# Patient Record
Sex: MALE | Race: WHITE | NOT HISPANIC OR LATINO | Employment: FULL TIME | URBAN - NONMETROPOLITAN AREA
[De-identification: names, ages, dates, MRNs, and addresses within clinical notes are randomized per-mention and may not be internally consistent; named-entity substitution may affect disease eponyms.]

---

## 2018-02-17 ENCOUNTER — HOSPITAL ENCOUNTER (EMERGENCY)
Facility: HOSPITAL | Age: 33
Discharge: HOME | End: 2018-02-17
Attending: PHYSICIAN ASSISTANT | Admitting: PHYSICIAN ASSISTANT
Payer: COMMERCIAL

## 2018-02-17 VITALS
SYSTOLIC BLOOD PRESSURE: 159 MMHG | DIASTOLIC BLOOD PRESSURE: 100 MMHG | BODY MASS INDEX: 33.76 KG/M2 | TEMPERATURE: 98.1 F | RESPIRATION RATE: 18 BRPM | OXYGEN SATURATION: 98 % | WEIGHT: 206 LBS

## 2018-02-17 DIAGNOSIS — M54.42 ACUTE LEFT-SIDED LOW BACK PAIN WITH LEFT-SIDED SCIATICA: ICD-10-CM

## 2018-02-17 DIAGNOSIS — M62.830 BACK MUSCLE SPASM: ICD-10-CM

## 2018-02-17 PROCEDURE — G0463 HOSPITAL OUTPT CLINIC VISIT: HCPCS | Mod: 25

## 2018-02-17 PROCEDURE — 25000128 H RX IP 250 OP 636: Performed by: PHYSICIAN ASSISTANT

## 2018-02-17 PROCEDURE — 96372 THER/PROPH/DIAG INJ SC/IM: CPT

## 2018-02-17 PROCEDURE — 99213 OFFICE O/P EST LOW 20 MIN: CPT | Performed by: PHYSICIAN ASSISTANT

## 2018-02-17 RX ORDER — KETOROLAC TROMETHAMINE 30 MG/ML
60 INJECTION, SOLUTION INTRAMUSCULAR; INTRAVENOUS ONCE
Status: COMPLETED | OUTPATIENT
Start: 2018-02-17 | End: 2018-02-17

## 2018-02-17 RX ORDER — DIAZEPAM 10 MG/2ML
10 INJECTION, SOLUTION INTRAMUSCULAR; INTRAVENOUS ONCE
Status: COMPLETED | OUTPATIENT
Start: 2018-02-17 | End: 2018-02-17

## 2018-02-17 RX ORDER — CYCLOBENZAPRINE HCL 10 MG
TABLET ORAL
Qty: 20 TABLET | Refills: 0 | Status: SHIPPED | OUTPATIENT
Start: 2018-02-17 | End: 2018-05-20

## 2018-02-17 RX ORDER — KETOROLAC TROMETHAMINE 10 MG/1
10 TABLET, FILM COATED ORAL EVERY 6 HOURS PRN
Qty: 10 TABLET | Refills: 0 | Status: SHIPPED | OUTPATIENT
Start: 2018-02-17 | End: 2018-05-20

## 2018-02-17 RX ADMIN — KETOROLAC TROMETHAMINE 60 MG: 30 INJECTION, SOLUTION INTRAMUSCULAR at 20:02

## 2018-02-17 RX ADMIN — DIAZEPAM 10 MG: 5 INJECTION, SOLUTION INTRAMUSCULAR; INTRAVENOUS at 20:02

## 2018-02-17 ASSESSMENT — ENCOUNTER SYMPTOMS
BACK PAIN: 1
NAUSEA: 0
MYALGIAS: 1
CONSTITUTIONAL NEGATIVE: 1
PSYCHIATRIC NEGATIVE: 1
CARDIOVASCULAR NEGATIVE: 1

## 2018-02-17 NOTE — LETTER
[facility]  [address]  [place] [address]  Phone: [phone]    February 17, 2018        Fahad Guallpa  [address]  [facility] [address]          To whom it may concern:    RE: Fahad Guallpa    Patient was seen and treated today at our clinic.    Please, excuse him from work on 18 Feb 2018, due to injury.    All further work limitations to be done by a Primary Care Provider or a Back Specialist.      Sincerely,        Betty Manuel Certified  Physician Assistant  2/17/2018  7:59 PM  URGENT CARE CLINIC

## 2018-02-17 NOTE — ED AVS SNAPSHOT
[facility]    [address] [address]    Phone:  [phone]                                       Fahad Guallpa   MRN: [id number]    Department:  [facility]   Date of Visit:  2/17/2018           After Visit Summary Signature Page     I have received my discharge instructions, and my questions have been answered. I have discussed any challenges I see with this plan with the nurse or doctor.    ..........................................................................................................................................  Patient/Patient Representative Signature      ..........................................................................................................................................  Patient Representative Print Name and Relationship to Patient    ..................................................               ................................................  Date                                            Time    ..........................................................................................................................................  Reviewed by Signature/Title    ...................................................              ..............................................  Date                                                            Time

## 2018-02-17 NOTE — ED AVS SNAPSHOT
[facility]    [address] [address]    Phone:  [phone]                                       Fahad Guallpa   MRN: [id number]    Department:  [facility]   Date of Visit:  2/17/2018           Patient Information     Date Of Birth          1985        Your diagnoses for this visit were:     Acute left-sided low back pain with left-sided sciatica     Back muscle spasm        You were seen by Betty Manuel PA.      Follow-up Information     Follow up with Lexi Alvarenga MD In 1 day.    Specialty:  Family Practice    Why:  Or with a Chiropractor for reevaluation and all further work limitations if needed    Contact information:    [facility]  [address] [facility] [address]  [phone]          Follow up with [facility].    Specialty:  EMERGENCY MEDICINE    Why:  If further concerns develop    Contact information:    [address] [address] [phone]    Additional information:    From [place]: Take [address]. Turn left at [address] [facility]. Turn left at the first stoplight on [address]. At the first stop sign, take a right onto [address]. Take a left into the parking lot and continue through until you reach the North enterance of the building.       From [place]: Take [address]. Take the [address] ramp towards [place]. Turn left onto [address]. Take a slight right onto [address] [facility]. Turn left at the first stoplight on [address]. At the first stop sign, take a right onto [address]. Take a left into the parking lot and continue through until you reach the North enterance of the building.       From [place]: Take [address]. Take a right at [address]. At the first stop sign, take a right onto [address]. Take a left into the parking lot and continue through until you reach the North enterance of the  building.       Discharge References/Attachments     BACK CARE TIPS (ENGLISH)    BACK SPASM, NO TRAUMA (ENGLISH)    BACK PAIN, RELIEVING (ENGLISH)    MUSCLE SPASM (ENGLISH)         Review of your medicines      START taking        Dose / Directions Last dose taken    cyclobenzaprine 10 MG tablet   Commonly known as:  FLEXERIL   Quantity:  20 tablet        Take half to one tablet every 8 hours as needed, when not going to work   Refills:  0        ketorolac 10 MG tablet   Commonly known as:  TORADOL   Dose:  10 mg   Quantity:  10 tablet        Take 1 tablet (10 mg) by mouth every 6 hours as needed for moderate pain   Refills:  0          Our records show that you are taking the medicines listed below. If these are incorrect, please call your family doctor or clinic.        Dose / Directions Last dose taken    BUSPAR PO   Dose:  10 mg        Take 10 mg by mouth 2 times daily   Refills:  0        LOSARTAN POTASSIUM PO        Refills:  0                Prescriptions were sent or printed at these locations (2 Prescriptions)                   [facility] [address] [place] [address], [place] - [address] AT [facility] [address]   [address], [facility] [address]    Telephone:  [phone]   Fax:  [phone]   Hours:                  E-Prescribed (2 of 2)         ketorolac (TORADOL) 10 MG tablet               cyclobenzaprine (FLEXERIL) 10 MG tablet                Orders Needing Specimen Collection     None      Pending Results     No orders found from 2/15/2018 to 2/18/2018.            Pending Culture Results     No orders found from 2/15/2018 to 2/18/2018.            Thank you for choosing [facility]       Thank you for choosing [facility] for your care. Our goal is always to provide you with excellent care. Hearing back from our patients is one way we can continue to improve our services. Please take a few minutes to complete the written survey that you may receive in the mail after you visit with us. Thank you!       "  MyChart Information     [employer] lets you send messages to your doctor, view your test results, renew your prescriptions, schedule appointments and more. To sign up, go to [email or web address] . Click on \"Log in\" on the left side of the screen, which will take you to the Welcome page. Then click on \"Sign up Now\" on the right side of the page.     You will be asked to enter the access code listed below, as well as some personal information. Please follow the directions to create your username and password.     Your access code is: [email or web address]  Expires: 2018  8:03 PM     Your access code will  in 90 days. If you need help or a new code, please call your [facility] clinic or [phone].        Care EveryWhere ID     This is your Care EveryWhere ID. This could be used by other organizations to access your [facility] medical records  [id number]        Equal Access to Services     SINAI LEAL AH: Hadii meaghan villaloboso Sourszula, waaxda luqadaha, qaybta kaalmada adechino, josé parker . So [facility] [phone].    ATENCIÓN: Si habla español, tiene a lee disposición servicios gratuitos de asistencia lingüística. Llame al [phone].    We comply with applicable federal civil rights laws and [place] laws. We do not discriminate on the basis of race, color, national origin, age, disability, sex, sexual orientation, or gender identity.            After Visit Summary       This is your record. Keep this with you and show to your community pharmacist(s) and doctor(s) at your next visit.                  "

## 2018-02-18 NOTE — ED PROVIDER NOTES
History     Chief Complaint   Patient presents with     Back Pain     lower back, more on L side. Started on Tuesday - worsened today     The history is provided by the patient. No  was used.     Fahad Guallpa is a 32 year old male who has left lower back pain with pain shooting down posterior and anterior thigh.  No loss of b/b control. No loss of leg strength. Denies direct trauma or fall. No fever. Has not been doing heavy lifting.     Past Medical History:    Past Medical History:   Diagnosis Date     Hypertension, benign 3/1/2011     Palpitations 5/14/2009       Past Surgical History:    Past Surgical History:   Procedure Laterality Date     root canal       VASECTOMY  2010       Family History:    Family History   Problem Relation Age of Onset     Colon Cancer Mother      DIABETES Paternal Grandmother      Hypertension Maternal Grandmother        Social History:  Marital Status:   [2]  Social History   Substance Use Topics     Smoking status: Former Smoker     Smokeless tobacco: Current User     Types: Chew      Comment: no passive smoke exposure     Alcohol use Yes      Comment: occasionally        Medications:      LOSARTAN POTASSIUM PO   ketorolac (TORADOL) 10 MG tablet   cyclobenzaprine (FLEXERIL) 10 MG tablet   BusPIRone HCl (BUSPAR PO)         Review of Systems   Constitutional: Negative.    Cardiovascular: Negative.    Gastrointestinal: Negative for nausea.   Musculoskeletal: Positive for back pain, gait problem and myalgias.   Psychiatric/Behavioral: Negative.        Physical Exam   BP: 159/100  Heart Rate: 94  Temp: 98.1  F (36.7  C)  Resp: 18  Weight: 93.4 kg (206 lb)  SpO2: 98 %      Physical Exam   Constitutional: He is oriented to person, place, and time. He appears well-developed and well-nourished. No distress.   Cardiovascular: Normal rate.    Pulmonary/Chest: Effort normal.   Musculoskeletal:   Back: no e/e/e/e, moderate TTP to left SI joint area. Trigger  points noted. M/n/v intact. Decreased flexion due to pain.   BLE:  +AFROM, 5/5 strength, m/n/v intact   Neurological: He is alert and oriented to person, place, and time.   Skin: He is not diaphoretic.   Psychiatric: He has a normal mood and affect.   Nursing note and vitals reviewed.      ED Course     ED Course     Procedures          Medications   ketorolac (TORADOL) injection 60 mg (60 mg Intramuscular Given 2/17/18 2002)   diazepam (VALIUM) injection 10 mg (10 mg Intramuscular Given 2/17/18 2002)   pt observed x 20 minutes. Pt tolerated well      Assessments & Plan (with Medical Decision Making)     I have reviewed the nursing notes.    I have reviewed the findings, diagnosis, plan and need for follow up with the patient.      New Prescriptions    CYCLOBENZAPRINE (FLEXERIL) 10 MG TABLET    Take half to one tablet every 8 hours as needed, when not going to work    KETOROLAC (TORADOL) 10 MG TABLET    Take 1 tablet (10 mg) by mouth every 6 hours as needed for moderate pain       Final diagnoses:   Acute left-sided low back pain with left-sided sciatica   Back muscle spasm         Work excuse for tomorrow  Patient verbally educated and given appropriate education sheets for the diagnoses and has no questions.  Take medications as directed.   Follow up with your Primary Care provider or Chiropractor tomorrow for reevaluation and for all further work limitations as needed.   if symptoms increase or if concerns develop, return to the ER  Betty Manuel Certified  Physician Assistant  2/17/2018  8:08 PM  URGENT CARE CLINIC      2/17/2018   [facility]     Betty Manuel PA  02/17/18 2009

## 2018-02-19 ENCOUNTER — OFFICE VISIT (OUTPATIENT)
Dept: CHIROPRACTIC MEDICINE | Facility: OTHER | Age: 33
End: 2018-02-19
Attending: CHIROPRACTOR
Payer: COMMERCIAL

## 2018-02-19 DIAGNOSIS — M54.50 ACUTE LEFT-SIDED LOW BACK PAIN WITHOUT SCIATICA: ICD-10-CM

## 2018-02-19 DIAGNOSIS — M99.01 SEGMENTAL AND SOMATIC DYSFUNCTION OF CERVICAL REGION: ICD-10-CM

## 2018-02-19 DIAGNOSIS — M99.03 SEGMENTAL AND SOMATIC DYSFUNCTION OF LUMBAR REGION: Primary | ICD-10-CM

## 2018-02-19 DIAGNOSIS — M99.02 SEGMENTAL AND SOMATIC DYSFUNCTION OF THORACIC REGION: ICD-10-CM

## 2018-02-19 PROCEDURE — 99201 ZZC OFFICE/OUTPT VISIT, NEW, LEVEL I: CPT | Mod: 25 | Performed by: CHIROPRACTOR

## 2018-02-19 PROCEDURE — 98941 CHIROPRACT MANJ 3-4 REGIONS: CPT | Mod: AT | Performed by: CHIROPRACTOR

## 2018-02-19 NOTE — MR AVS SNAPSHOT
"              After Visit Summary   2/19/2018    Fahad Guallpa    MRN: [id number]           Patient Information     Date Of Birth          1985        Visit Information        Provider Department      2/19/2018 3:10 PM Dakota Hopkins [facility]  [facility]        Today's Diagnoses     Segmental and somatic dysfunction of lumbar region    -  1    Acute left-sided low back pain without sciatica        Segmental and somatic dysfunction of thoracic region        Segmental and somatic dysfunction of cervical region           Follow-ups after your visit        Who to contact     If you have questions or need follow up information about today's clinic visit or your schedule please contact  [facility] directly at [phone].  Normal or non-critical lab and imaging results will be communicated to you by [employer], letter or phone within 4 business days after the clinic has received the results. If you do not hear from us within 7 days, please contact the clinic through [employer] or phone. If you have a critical or abnormal lab result, we will notify you by phone as soon as possible.  Submit refill requests through [employer] or call your pharmacy and they will forward the refill request to us. Please allow 3 business days for your refill to be completed.          Additional Information About Your Visit        MyChart Information     [employer] lets you send messages to your doctor, view your test results, renew your prescriptions, schedule appointments and more. To sign up, go to [email or web address] . Click on \"Log in\" on the left side of the screen, which will take you to the Welcome page. Then click on \"Sign up Now\" on the right side of the page.     You will be asked to enter the access code listed below, as well as some personal information. Please follow the directions to create your username and password.     Your access code is: [email or web address]  Expires: 5/18/2018  8:03 PM     Your access code will "  in 90 days. If you need help or a new code, please call your [facility] clinic or [phone].        Care EveryWhere ID     This is your Care EveryWhere ID. This could be used by other organizations to access your [facility] medical records  [id number]         Blood Pressure from Last 3 Encounters:   18 159/100   04/05/15 149/99   10/18/14 135/91    Weight from Last 3 Encounters:   18 206 lb (93.4 kg)   13 196 lb (88.9 kg)              We Performed the Following     CHIROPRAC MANIP,SPINAL,3-4 REGIONS        Primary Care Provider Office Phone # Fax #    Lexi Alvarenga [phone]       [facility] [address] [facility] [address]        Equal Access to Services     JENNY LEAL : Hadii aad ku hadasho Soomaali, waaxda luqadaha, qaybta kaalmada adeegyada, josé parker . So [facility] [phone].    ATENCIÓN: Si habla español, tiene a lee disposición servicios gratuitos de asistencia lingüística. Norma al [phone].    We comply with applicable federal civil rights laws and [place] laws. We do not discriminate on the basis of race, color, national origin, age, disability, sex, sexual orientation, or gender identity.            Thank you!     Thank you for choosing  [facility]  for your care. Our goal is always to provide you with excellent care. Hearing back from our patients is one way we can continue to improve our services. Please take a few minutes to complete the written survey that you may receive in the mail after your visit with us. Thank you!             Your Updated Medication List - Protect others around you: Learn how to safely use, store and throw away your medicines at www.disposemymeds.org.          This list is accurate as of 18  3:58 PM.  Always use your most recent med list.                   Brand Name Dispense Instructions for use Diagnosis    BUSPAR PO      Take 10 mg by mouth 2 times daily         cyclobenzaprine 10 MG tablet    FLEXERIL    20 tablet    Take half to one tablet every 8 hours as needed, when not going to work        ketorolac 10 MG tablet    TORADOL    10 tablet    Take 1 tablet (10 mg) by mouth every 6 hours as needed for moderate pain        LOSARTAN POTASSIUM PO

## 2018-02-19 NOTE — PROGRESS NOTES
Subjective Finding:    Chief compalint: Patient presents with:  Back Pain: bilateral. left worse  Neck Pain: left arm pain  , Pain Scale: 6/10, Intensity: sharp, Duration: 1 weeks, Radiating: left buttock.    Date of injury:     Activities that the pain restricts:   Home/household/hobbies/social activities: yes.  Work duties: yes.  Sleep: yes.  Makes symptoms better: rest.  Makes symptoms worse: activity.  Have you seen anyone else for the symptoms? Yes: MD.  Work related: no.  Automobile related injury: no.    Objective and Assessment:    Posture Analysis:   High shoulder: .  Head tilt: .  High iliac crest: left.  Head carriage: neutral.  Thoracic Kyphosis: neutral.  Lumbar Lordosis: forward.    Lumbar Range of Motion: extension decreased and left lateral flexion decreased.  Cervical Range of Motion: .  Thoracic Range of Motion: .  Extremity Range of Motion: .    Palpation:   Quad lumb: left, referred pain: no    Segmental dysfunction pre-treatment and treatment area: C4, C6, T6, L5 and PSIS Left.    Assessment post-treatment:  Cervical: ROM increased.  Thoracic: ROM increased.  Lumbar: ROM increased.    Comments: .  Was in ER for the pain over the weekend    Complicating Factors: .    Procedure(s):  CMT:  77432 Chiropractic manipulative treatment 3-4 regions performed   Cervical: Diversified, See above for level, Supine, Thoracic: Diversified, See above for level, Prone and Lumbar: Diversified, See above for level, Side posture    Modalities:  None performed this visit    Therapeutic procedures:  None    Plan:  Treatment plan: PRN.  Instructed patient: stretch as instructed at visit.  Short term goals: reduce pain.  Long term goals: restore normal function.  Prognosis: excellent.

## 2018-05-20 ENCOUNTER — HOSPITAL ENCOUNTER (EMERGENCY)
Facility: HOSPITAL | Age: 33
Discharge: HOME | End: 2018-05-20
Attending: NURSE PRACTITIONER | Admitting: NURSE PRACTITIONER
Payer: COMMERCIAL

## 2018-05-20 VITALS
SYSTOLIC BLOOD PRESSURE: 146 MMHG | OXYGEN SATURATION: 96 % | RESPIRATION RATE: 18 BRPM | DIASTOLIC BLOOD PRESSURE: 78 MMHG | TEMPERATURE: 97.7 F

## 2018-05-20 DIAGNOSIS — H10.9 BACTERIAL CONJUNCTIVITIS OF LEFT EYE: ICD-10-CM

## 2018-05-20 PROCEDURE — G0463 HOSPITAL OUTPT CLINIC VISIT: HCPCS

## 2018-05-20 PROCEDURE — 99213 OFFICE O/P EST LOW 20 MIN: CPT | Performed by: NURSE PRACTITIONER

## 2018-05-20 RX ORDER — POLYMYXIN B SULFATE AND TRIMETHOPRIM 1; 10000 MG/ML; [USP'U]/ML
1 SOLUTION OPHTHALMIC
Qty: 1 BOTTLE | Refills: 0 | OUTPATIENT
Start: 2018-05-20 | End: 2024-08-08

## 2018-05-20 RX ORDER — POLYMYXIN B SULFATE AND TRIMETHOPRIM 1; 10000 MG/ML; [USP'U]/ML
1 SOLUTION OPHTHALMIC 4 TIMES DAILY
Qty: 2 ML | Refills: 0 | Status: SHIPPED | OUTPATIENT
Start: 2018-05-20 | End: 2018-05-27

## 2018-05-20 ASSESSMENT — ENCOUNTER SYMPTOMS
RESPIRATORY NEGATIVE: 1
MUSCULOSKELETAL NEGATIVE: 1
HEMATOLOGIC/LYMPHATIC NEGATIVE: 1
PSYCHIATRIC NEGATIVE: 1
ALLERGIC/IMMUNOLOGIC NEGATIVE: 1
EYE REDNESS: 1
CARDIOVASCULAR NEGATIVE: 1
EYE DISCHARGE: 1
NEUROLOGICAL NEGATIVE: 1
CONSTITUTIONAL NEGATIVE: 1
ENDOCRINE NEGATIVE: 1
GASTROINTESTINAL NEGATIVE: 1

## 2018-05-20 NOTE — ED PROVIDER NOTES
History     Chief Complaint   Patient presents with     Conjunctivitis     woke up with matter in left eye and redness. denies any trauma or getting anything in his eye. denies pain.      The history is provided by the patient.     Fahad Guallpa is a 32 year old male who presents to the  care for possible pink eye.  He states he woke up with morning with left goopy and stuck shut. Denies any pain or itching to the eye. He did clean his eye out with saline solution this morning and has decreased drainage.     Problem List:    There are no active problems to display for this patient.       Past Medical History:    Past Medical History:   Diagnosis Date     Hypertension, benign 3/1/2011     Palpitations 5/14/2009       Past Surgical History:    Past Surgical History:   Procedure Laterality Date     root canal       VASECTOMY  2010       Family History:    Family History   Problem Relation Age of Onset     Colon Cancer Mother      DIABETES Paternal Grandmother      Hypertension Maternal Grandmother        Social History:  Marital Status:   [2]  Social History   Substance Use Topics     Smoking status: Former Smoker     Smokeless tobacco: Current User     Types: Chew      Comment: no passive smoke exposure     Alcohol use Yes      Comment: occasionally        Medications:      BusPIRone HCl (BUSPAR PO)   LOSARTAN POTASSIUM PO   trimethoprim-polymyxin b (POLYTRIM) ophthalmic solution         Review of Systems   Constitutional: Negative.    Eyes: Positive for discharge and redness.   Respiratory: Negative.    Cardiovascular: Negative.    Gastrointestinal: Negative.    Endocrine: Negative.    Genitourinary: Negative.    Musculoskeletal: Negative.    Skin: Negative.    Allergic/Immunologic: Negative.    Neurological: Negative.    Hematological: Negative.    Psychiatric/Behavioral: Negative.        Physical Exam   BP: 146/78  Heart Rate: 100  Temp: 97.7  F (36.5  C)  Resp: 18  SpO2: 96 %      Physical Exam    Eyes: Pupils are equal, round, and reactive to light. Right conjunctiva is not injected. Left conjunctiva is injected.   Cardiovascular: Normal heart sounds.    Pulmonary/Chest: Effort normal.   Nursing note and vitals reviewed.      ED Course     ED Course     Procedures               Critical Care time:  none               No results found for this or any previous visit (from the past 24 hour(s)).    Medications - No data to display    Assessments & Plan (with Medical Decision Making)     I have reviewed the nursing notes.    I have reviewed the findings, diagnosis, plan and need for follow up with the patient.   Fahad is D/C to home. Note provided for work. Discussed treatment and use of drops. Fahad should follow up with opthalmology if he develops pain, change in vision or any concerns with his eyes. Fahad agrees with plan.     Discharge Medication List as of 5/20/2018  5:29 PM      START taking these medications    Details   trimethoprim-polymyxin b (POLYTRIM) ophthalmic solution Apply 1 drop to eye 4 times daily for 7 days, Disp-2 mL, R-0, E-Prescribe             Final diagnoses:   Bacterial conjunctivitis of left eye       5/20/2018   [facility]     [facility] LEROY CNP  05/20/18 2799

## 2018-05-20 NOTE — DISCHARGE INSTRUCTIONS
Bacterial Conjunctivitis    You have an infection in the membranes covering the white part of the eye. This part of the eye is called the conjunctiva. The infection is called conjunctivitis. The most common symptoms of conjunctivitis include a thick, pus-like discharge from the eye, swollen eyelids, redness, eyelids sticking together upon awakening, and a gritty or scratchy feeling in the eye. Your infection was caused by bacteria. It may be treated with medicine. With treatment, the infection takes about 7 to 10 days to resolve.  Home care    Use prescribed antibiotic eye drops or ointment as directed to treat the infection.    Apply a warm compress (towel soaked in warm water) to the affected eye 3 to 4 times a day. Do this just before applying medicine to the eye.    Use a warm, wet cloth to wipe away crusting of the eyelids in the morning. This is caused by mucus drainage during the night. You may also use saline irrigating solution or artificial tears to rinse away mucus in the eye. Do not put a patch over the eye.    Wash your hands before and after touching the infected eye. This is to prevent spreading the infection to the other eye, and to other people. Don't share your towels or washcloths with others.    You may use acetaminophen or ibuprofen to control pain, unless another medicine was prescribed. (Note: If you have chronic liver or kidney disease or have ever had a stomach ulcer or gastrointestinal bleeding, talk with your doctor before using these medicines.)    Don't wear contact lenses until your eyes have healed and all symptoms are gone.  Follow-up care  Follow up with your healthcare provider, or as advised.  When to seek medical advice  Call your healthcare provider right away if any of these occur:    Worsening vision    Increasing pain in the eye    Increasing swelling or redness of the eyelid    Redness spreading around the eye  Date Last Reviewed: 7/1/2017 2000-2017 [employer] [address] [place] [address]. All rights reserved. This information is not intended as a substitute for professional medical care. Always follow your healthcare professional's instructions.

## 2018-05-20 NOTE — LETTER
[facility]  [address]  [place] [address]  Phone: [phone]    May 20, 2018        Fahad Guallpa  [address]  [place] [address]          To whom it may concern:    RE: Fahad Guallpa    Patient was seen and treated today at our clinic. Please excuse from work on 5/21/2018 for illness    Please contact me for questions or concerns.      Sincerely,        Mickie HARPER [facility]  Family Nurse Practitioner

## 2018-05-20 NOTE — ED AVS SNAPSHOT
[facility]    [address] [address]    Phone:  [phone]                                       Fahad Guallpa   MRN: [id number]    Department:  [facility]   Date of Visit:  5/20/2018           Patient Information     Date Of Birth          1985        Your diagnoses for this visit were:     Bacterial conjunctivitis of left eye        You were seen by Mickie Hager APRN CNP.      Follow-up Information     Follow up with Lexi Alvarenga MD.    Specialty:  Family Practice    Why:  As needed, If symptoms worsen    Contact information:    formerly [facility]  [address] [facility] [address] [phone]          Follow up with [facility].    Specialty:  EMERGENCY MEDICINE    Why:  If symptoms worsen    Contact information:    [address] [address] [phone]    Additional information:    From [place]: Take [address]. Turn left at [address]. Turn left at the first stoplight on [address]. At the first stop sign, take a right onto [address]. Take a left into the parking lot and continue through until you reach the North enterance of the building.       From [place]: Take [address]. Take the [address] ramp towards [place]. Turn left onto [address]. Take a slight right onto [address]. Turn left at the first stoplight on [address]. At the first stop sign, take a right onto [address]. Take a left into the parking lot and continue through until you reach the North enterance of the building.       From [place]: Take [address]. Take a right at [address]. At the first stop sign, take a right onto [address]. Take a left into the parking lot and continue through until you reach the North enterance of the building.         Discharge Instructions         Bacterial Conjunctivitis    You have an infection in  the membranes covering the white part of the eye. This part of the eye is called the conjunctiva. The infection is called conjunctivitis. The most common symptoms of conjunctivitis include a thick, pus-like discharge from the eye, swollen eyelids, redness, eyelids sticking together upon awakening, and a gritty or scratchy feeling in the eye. Your infection was caused by bacteria. It may be treated with medicine. With treatment, the infection takes about 7 to 10 days to resolve.  Home care    Use prescribed antibiotic eye drops or ointment as directed to treat the infection.    Apply a warm compress (towel soaked in warm water) to the affected eye 3 to 4 times a day. Do this just before applying medicine to the eye.    Use a warm, wet cloth to wipe away crusting of the eyelids in the morning. This is caused by mucus drainage during the night. You may also use saline irrigating solution or artificial tears to rinse away mucus in the eye. Do not put a patch over the eye.    Wash your hands before and after touching the infected eye. This is to prevent spreading the infection to the other eye, and to other people. Don't share your towels or washcloths with others.    You may use acetaminophen or ibuprofen to control pain, unless another medicine was prescribed. (Note: If you have chronic liver or kidney disease or have ever had a stomach ulcer or gastrointestinal bleeding, talk with your doctor before using these medicines.)    Don't wear contact lenses until your eyes have healed and all symptoms are gone.  Follow-up care  Follow up with your healthcare provider, or as advised.  When to seek medical advice  Call your healthcare provider right away if any of these occur:    Worsening vision    Increasing pain in the eye    Increasing swelling or redness of the eyelid    Redness spreading around the eye  Date Last Reviewed: 7/1/2017 2000-2017 [employer]. [address] [place] [address], [place] [address]. All  "rights reserved. This information is not intended as a substitute for professional medical care. Always follow your healthcare professional's instructions.             Review of your medicines      START taking        Dose / Directions Last dose taken    trimethoprim-polymyxin b ophthalmic solution   Commonly known as:  POLYTRIM   Dose:  1 drop   Quantity:  2 mL        Apply 1 drop to eye 4 times daily for 7 days   Refills:  0          Our records show that you are taking the medicines listed below. If these are incorrect, please call your family doctor or clinic.        Dose / Directions Last dose taken    BUSPAR PO   Dose:  10 mg        Take 10 mg by mouth 2 times daily   Refills:  0        LOSARTAN POTASSIUM PO        Refills:  0                Prescriptions were sent or printed at these locations (1 Prescription)                   [facility] - [place], [place] - [address] AT [facility] [address], [place] [address]    Telephone:  [phone]   Fax:  [phone]   Hours:                  E-Prescribed (1 of 1)         trimethoprim-polymyxin b (POLYTRIM) ophthalmic solution                Orders Needing Specimen Collection     None      Pending Results     No orders found from 5/18/2018 to 5/21/2018.            Pending Culture Results     No orders found from 5/18/2018 to 5/21/2018.            Thank you for choosing [facility]       Thank you for choosing [facility] for your care. Our goal is always to provide you with excellent care. Hearing back from our patients is one way we can continue to improve our services. Please take a few minutes to complete the written survey that you may receive in the mail after you visit with us. Thank you!        [employer] Information     [facility] lets you send messages to your doctor, view your test results, renew your prescriptions, schedule appointments and more. To sign up, go to [email or web address] . Click on \"Log in\" on the left side of " "the screen, which will take you to the Welcome page. Then click on \"Sign up Now\" on the right side of the page.     You will be asked to enter the access code listed below, as well as some personal information. Please follow the directions to create your username and password.     Your access code is: [email or web address]  Expires: 2018  5:29 PM     Your access code will  in 90 days. If you need help or a new code, please call your [place] clinic or [phone].        Care EveryWhere ID     This is your Care EveryWhere ID. This could be used by other organizations to access your [place] medical records  [id number]        Equal Access to Services     SINAI LEAL : Eh Brooke, dena steven, ally ramos, josé coker. So [facility] [phone].    ATENCIÓN: Si habla español, tiene a lee disposición servicios gratuitos de asistencia lingüística. Llame al [phone].    We comply with applicable federal civil rights laws and [place] laws. We do not discriminate on the basis of race, color, national origin, age, disability, sex, sexual orientation, or gender identity.            After Visit Summary       This is your record. Keep this with you and show to your community pharmacist(s) and doctor(s) at your next visit.                  "

## 2018-05-20 NOTE — ED AVS SNAPSHOT
[facility]    [address] [address]    Phone:  [phone]                                       Fahad Guallpa   MRN: [id number]    Department:  [facility]   Date of Visit:  5/20/2018           After Visit Summary Signature Page     I have received my discharge instructions, and my questions have been answered. I have discussed any challenges I see with this plan with the nurse or doctor.    ..........................................................................................................................................  Patient/Patient Representative Signature      ..........................................................................................................................................  Patient Representative Print Name and Relationship to Patient    ..................................................               ................................................  Date                                            Time    ..........................................................................................................................................  Reviewed by Signature/Title    ...................................................              ..............................................  Date                                                            Time

## 2020-01-15 ENCOUNTER — OFFICE VISIT (OUTPATIENT)
Dept: CHIROPRACTIC MEDICINE | Facility: OTHER | Age: 35
End: 2020-01-15
Attending: CHIROPRACTOR
Payer: COMMERCIAL

## 2020-01-15 DIAGNOSIS — M54.50 ACUTE BILATERAL LOW BACK PAIN WITHOUT SCIATICA: ICD-10-CM

## 2020-01-15 DIAGNOSIS — M99.01 SEGMENTAL AND SOMATIC DYSFUNCTION OF CERVICAL REGION: ICD-10-CM

## 2020-01-15 DIAGNOSIS — M99.03 SEGMENTAL AND SOMATIC DYSFUNCTION OF LUMBAR REGION: Primary | ICD-10-CM

## 2020-01-15 DIAGNOSIS — M99.02 SEGMENTAL AND SOMATIC DYSFUNCTION OF THORACIC REGION: ICD-10-CM

## 2020-01-15 PROCEDURE — 99212 OFFICE O/P EST SF 10 MIN: CPT | Mod: 25 | Performed by: CHIROPRACTOR

## 2020-01-15 PROCEDURE — 98941 CHIROPRACT MANJ 3-4 REGIONS: CPT | Mod: AT | Performed by: CHIROPRACTOR

## 2020-01-15 NOTE — PROGRESS NOTES
Subjective Finding:    Chief compalint: Patient presents with:  Back Pain  Neck Pain  , Pain Scale: 6/10, Intensity: sharp, Duration: 1 weeks, Radiating: left buttock.    Date of injury:     Activities that the pain restricts:   Home/household/hobbies/social activities: yes.  Work duties: yes.  Sleep: yes.  Makes symptoms better: rest.  Makes symptoms worse: activity.  Have you seen anyone else for the symptoms? Yes: MD.  Work related: no.  Automobile related injury: no.    Objective and Assessment:    Posture Analysis:   High shoulder: .  Head tilt: .  High iliac crest: left.  Head carriage: neutral.  Thoracic Kyphosis: neutral.  Lumbar Lordosis: forward.    Lumbar Range of Motion: extension decreased and left lateral flexion decreased.  Cervical Range of Motion: .  Thoracic Range of Motion: .  Extremity Range of Motion: .    Palpation:   Quad lumb: left, referred pain: no    Segmental dysfunction pre-treatment and treatment area: C4, C6, T6, L5 and PSIS Left.    Assessment post-treatment:  Cervical: ROM increased.  Thoracic: ROM increased.  Lumbar: ROM increased.    Comments: .    Complicating Factors: .    Procedure(s):  [facility]:  42429 Chiropractic manipulative treatment 3-4 regions performed   Cervical: Diversified, See above for level, Supine, Thoracic: Diversified, See above for level, Prone and Lumbar: Diversified, See above for level, Side posture    Modalities:  None performed this visit    Therapeutic procedures:  None    Plan:  Treatment plan: PRN.  Instructed patient: stretch as instructed at visit.  Short term goals: reduce pain.  Long term goals: restore normal function.  Prognosis: excellent.

## 2020-03-04 ENCOUNTER — OFFICE VISIT (OUTPATIENT)
Dept: CHIROPRACTIC MEDICINE | Facility: OTHER | Age: 35
End: 2020-03-04
Attending: CHIROPRACTOR
Payer: COMMERCIAL

## 2020-03-04 DIAGNOSIS — M99.02 SEGMENTAL AND SOMATIC DYSFUNCTION OF THORACIC REGION: ICD-10-CM

## 2020-03-04 DIAGNOSIS — M99.03 SEGMENTAL AND SOMATIC DYSFUNCTION OF LUMBAR REGION: Primary | ICD-10-CM

## 2020-03-04 DIAGNOSIS — M54.50 ACUTE BILATERAL LOW BACK PAIN WITHOUT SCIATICA: ICD-10-CM

## 2020-03-04 PROCEDURE — 98940 CHIROPRACT MANJ 1-2 REGIONS: CPT | Mod: AT | Performed by: CHIROPRACTOR

## 2020-03-04 NOTE — PROGRESS NOTES
Subjective Finding:    Chief compalint: Patient presents with:  Back Pain  , Pain Scale: 6/10, Intensity: sharp, Duration: 1 weeks, Radiating: left buttock.    Date of injury:     Activities that the pain restricts:   Home/household/hobbies/social activities: yes.  Work duties: yes.  Sleep: yes.  Makes symptoms better: rest.  Makes symptoms worse: activity.  Have you seen anyone else for the symptoms? Yes: MD.  Work related: no.  Automobile related injury: no.    Objective and Assessment:    Posture Analysis:   High shoulder: .  Head tilt: .  High iliac crest: left.  Head carriage: neutral.  Thoracic Kyphosis: neutral.  Lumbar Lordosis: forward.    Lumbar Range of Motion: extension decreased and left lateral flexion decreased.  Cervical Range of Motion: .  Thoracic Range of Motion: .  Extremity Range of Motion: .    Palpation:   Quad lumb: left, referred pain: no    Segmental dysfunction pre-treatment and treatment area: C4, C6, T6, L5 and PSIS Left.    Assessment post-treatment:  Cervical: ROM increased.  Thoracic: ROM increased.  Lumbar: ROM increased.    Comments: .    Complicating Factors: .    Procedure(s):  [facility]:  20328 Chiropractic manipulative treatment 3-4 regions performed   Cervical: Diversified, See above for level, Supine, Thoracic: Diversified, See above for level, Prone and Lumbar: Diversified, See above for level, Side posture    Modalities:  None performed this visit    Therapeutic procedures:  None    Plan:  Treatment plan: PRN.  Instructed patient: stretch as instructed at visit.  Short term goals: reduce pain.  Long term goals: restore normal function.  Prognosis: excellent.

## 2020-09-11 ENCOUNTER — HOSPITAL ENCOUNTER (EMERGENCY)
Facility: HOSPITAL | Age: 35
Discharge: HOME | End: 2020-09-11
Attending: PHYSICIAN ASSISTANT | Admitting: PHYSICIAN ASSISTANT
Payer: COMMERCIAL

## 2020-09-11 VITALS
OXYGEN SATURATION: 97 % | SYSTOLIC BLOOD PRESSURE: 135 MMHG | RESPIRATION RATE: 16 BRPM | HEART RATE: 110 BPM | DIASTOLIC BLOOD PRESSURE: 86 MMHG | TEMPERATURE: 97.6 F

## 2020-09-11 DIAGNOSIS — H10.9 CONJUNCTIVITIS: ICD-10-CM

## 2020-09-11 PROCEDURE — G0463 HOSPITAL OUTPT CLINIC VISIT: HCPCS

## 2020-09-11 PROCEDURE — 99213 OFFICE O/P EST LOW 20 MIN: CPT | Mod: Z6 | Performed by: PHYSICIAN ASSISTANT

## 2020-09-11 RX ORDER — TOBRAMYCIN 3 MG/ML
1-2 SOLUTION/ DROPS OPHTHALMIC EVERY 4 HOURS
Qty: 5 ML | Refills: 0 | Status: SHIPPED | OUTPATIENT
Start: 2020-09-11 | End: 2020-09-18

## 2020-09-11 ASSESSMENT — ENCOUNTER SYMPTOMS
EYE REDNESS: 1
EYE DISCHARGE: 1
CHILLS: 0
PHOTOPHOBIA: 0
FATIGUE: 0
FEVER: 0
COUGH: 0

## 2020-09-11 NOTE — ED PROVIDER NOTES
History     Chief Complaint   Patient presents with     Conjunctivitis     HPI  Fahad Guallpa is a 35 year old male who presents to urgent care for evaluation of conjunctivitis in right eye.  Patient states that he awoke this morning with redness, crusting and purulent discharge in his right eye.  He states that with the drainage he sometimes has blurred vision but once he clears it his vision is normal.  He denies any fevers, cold symptoms, or any other associated symptoms.  Patient tried flushing his eye with saline solution.    Allergies:  Allergies   Allergen Reactions     Amoxicillin      GI upset     Seasonal Allergies        Problem List:    There are no active problems to display for this patient.       Past Medical History:    Past Medical History:   Diagnosis Date     Hypertension, benign 3/1/2011     Palpitations 5/14/2009       Past Surgical History:    Past Surgical History:   Procedure Laterality Date     root canal       VASECTOMY  2010       Family History:    Family History   Problem Relation Age of Onset     Colon Cancer Mother      Diabetes Paternal Grandmother      Hypertension Maternal Grandmother        Social History:  Marital Status:   [2]  Social History     Tobacco Use     Smoking status: Former Smoker     Smokeless tobacco: Current User     Types: Chew     Tobacco comment: no passive smoke exposure   Substance Use Topics     Alcohol use: Yes     Comment: occasionally     Drug use: No        Medications:    BusPIRone HCl (BUSPAR PO)  LOSARTAN POTASSIUM PO  tobramycin (TOBREX) 0.3 % ophthalmic solution          Review of Systems   Constitutional: Negative for chills, fatigue and fever.   HENT: Negative for congestion.    Eyes: Positive for discharge and redness. Negative for photophobia and visual disturbance.   Respiratory: Negative for cough.    All other systems reviewed and are negative.      Physical Exam   BP: 135/86  Pulse: 110  Temp: 97.6  F (36.4  C)  Resp: 16  SpO2: 97  %      Physical Exam  Vitals signs and nursing note reviewed.   Constitutional:       General: He is not in acute distress.     Appearance: He is not ill-appearing or toxic-appearing.   HENT:      Head: Normocephalic.      Nose: Nose normal.   Eyes:      General:         Right eye: Discharge present.      Extraocular Movements: Extraocular movements intact.      Conjunctiva/sclera:      Right eye: Right conjunctiva is injected.      Pupils: Pupils are equal, round, and reactive to light.   Cardiovascular:      Rate and Rhythm: Regular rhythm.      Heart sounds: Normal heart sounds.   Pulmonary:      Breath sounds: Normal breath sounds.   Neurological:      Mental Status: He is alert and oriented to person, place, and time.         ED Course        Procedures              Critical Care time:               No results found for this or any previous visit (from the past 24 hour(s)).    Medications - No data to display    Assessments & Plan (with Medical Decision Making)   #1.  Conjunctivitis, right eye    Discussed exam findings with patient.  Patient is prescribed Tobrex ophthalmic solution.  Proper hygiene is discussed at length with patient.  Patient given work note excusing him from work tonight.  Any further concerns please return to urgent care or follow-up with primary care provider.  Patient verbalizes understanding and agreement of plan.        I have reviewed the nursing notes.    I have reviewed the findings, diagnosis, plan and need for follow up with the patient.      New Prescriptions    TOBRAMYCIN (TOBREX) 0.3 % OPHTHALMIC SOLUTION    Place 1-2 drops into the right eye every 4 hours for 7 days       Final diagnoses:   Conjunctivitis       9/11/2020   [facility]     Louis Tracey PA-C  09/11/20 1827

## 2020-09-11 NOTE — ED NOTES
Patient discharged with understanding of instructions and recommendations.   Denies any questions or concerns.     Adalgisa Mancini LPN on 9/11/2020 at 6:33 PM

## 2020-09-11 NOTE — ED AVS SNAPSHOT
[facility]  [address] [address]  Phone:  [phone]                                    Fahad Guallpa   MRN: [id number]    Department[facility]  [facility]   Date of Visit:  9/11/2020           After Visit Summary Signature Page    I have received my discharge instructions, and my questions have been answered. I have discussed any challenges I see with this plan with the nurse or doctor.    ..........................................................................................................................................  Patient/Patient Representative Signature      ..........................................................................................................................................  Patient Representative Print Name and Relationship to Patient    ..................................................               ................................................  Date                                   Time    ..........................................................................................................................................  Reviewed by Signature/Title    ...................................................              ..............................................  Date                                               Time          22EPIC Rev 08/18

## 2020-09-11 NOTE — ED TRIAGE NOTES
"Patient presents today with c/o right eye irritation.   Worked midnights last night - woke up at 4pm and noticed eye.   Redness / draining / crusting / \"gets gunky.\"  Blurred vision when drainage builds up.   Denies pain.   Tried to flush with saline solution.   Wears glasses and contacts.   "

## 2020-12-19 ENCOUNTER — ALLIED HEALTH/NURSE VISIT (OUTPATIENT)
Dept: FAMILY MEDICINE | Facility: OTHER | Age: 35
End: 2020-12-19
Attending: FAMILY MEDICINE
Payer: COMMERCIAL

## 2020-12-19 DIAGNOSIS — R05.9 COUGH: Primary | ICD-10-CM

## 2020-12-19 PROCEDURE — U0003 INFECTIOUS AGENT DETECTION BY NUCLEIC ACID (DNA OR RNA); SEVERE ACUTE RESPIRATORY SYNDROME CORONAVIRUS 2 (SARS-COV-2) (CORONAVIRUS DISEASE [COVID-19]), AMPLIFIED PROBE TECHNIQUE, MAKING USE OF HIGH THROUGHPUT TECHNOLOGIES AS DESCRIBED BY CMS-2020-01-R: HCPCS | Mod: ZL | Performed by: FAMILY MEDICINE

## 2020-12-19 PROCEDURE — C9803 HOPD COVID-19 SPEC COLLECT: HCPCS

## 2020-12-19 NOTE — LETTER
[facility]  [address]  [place] [address]  [phone]          December 19, 2020    RE:  Fahad Guallpa                                                                                                                                                       [address] [place] [address]            To whom it may concern:    Patient was seen today for Covid testing.       Thank you,          Comfort Camarena LPN

## 2020-12-20 LAB
SARS-COV-2 RNA SPEC QL NAA+PROBE: NOT DETECTED
SPECIMEN SOURCE: NORMAL

## 2021-01-15 ENCOUNTER — HEALTH MAINTENANCE LETTER (OUTPATIENT)
Age: 36
End: 2021-01-15

## 2021-09-25 ENCOUNTER — HEALTH MAINTENANCE LETTER (OUTPATIENT)
Age: 36
End: 2021-09-25

## 2022-03-12 ENCOUNTER — HEALTH MAINTENANCE LETTER (OUTPATIENT)
Age: 37
End: 2022-03-12

## 2022-07-09 ENCOUNTER — HOSPITAL ENCOUNTER (EMERGENCY)
Facility: HOSPITAL | Age: 37
Discharge: HOME | End: 2022-07-09
Attending: INTERNAL MEDICINE | Admitting: INTERNAL MEDICINE
Payer: COMMERCIAL

## 2022-07-09 ENCOUNTER — APPOINTMENT (OUTPATIENT)
Dept: GENERAL RADIOLOGY | Facility: HOSPITAL | Age: 37
End: 2022-07-09
Attending: INTERNAL MEDICINE
Payer: COMMERCIAL

## 2022-07-09 VITALS
WEIGHT: 200 LBS | DIASTOLIC BLOOD PRESSURE: 101 MMHG | HEIGHT: 68 IN | TEMPERATURE: 96.8 F | RESPIRATION RATE: 16 BRPM | SYSTOLIC BLOOD PRESSURE: 148 MMHG | BODY MASS INDEX: 30.31 KG/M2 | HEART RATE: 108 BPM | OXYGEN SATURATION: 93 %

## 2022-07-09 DIAGNOSIS — S82.831A CLOSED FRACTURE OF DISTAL END OF RIGHT FIBULA, UNSPECIFIED FRACTURE MORPHOLOGY, INITIAL ENCOUNTER: ICD-10-CM

## 2022-07-09 PROCEDURE — 99283 EMERGENCY DEPT VISIT LOW MDM: CPT | Performed by: INTERNAL MEDICINE

## 2022-07-09 PROCEDURE — 99283 EMERGENCY DEPT VISIT LOW MDM: CPT

## 2022-07-09 PROCEDURE — 73610 X-RAY EXAM OF ANKLE: CPT | Mod: RT

## 2022-07-09 PROCEDURE — 250N000013 HC RX MED GY IP 250 OP 250 PS 637: Performed by: INTERNAL MEDICINE

## 2022-07-09 RX ORDER — OXYCODONE HYDROCHLORIDE 5 MG/1
5 TABLET ORAL ONCE
Status: COMPLETED | OUTPATIENT
Start: 2022-07-09 | End: 2022-07-09

## 2022-07-09 RX ORDER — OXYCODONE AND ACETAMINOPHEN 5; 325 MG/1; MG/1
1 TABLET ORAL EVERY 6 HOURS PRN
Qty: 10 TABLET | Refills: 0 | Status: SHIPPED | OUTPATIENT
Start: 2022-07-09 | End: 2024-05-09

## 2022-07-09 RX ORDER — IBUPROFEN 600 MG/1
600 TABLET, FILM COATED ORAL ONCE
Status: COMPLETED | OUTPATIENT
Start: 2022-07-09 | End: 2022-07-09

## 2022-07-09 RX ADMIN — IBUPROFEN 600 MG: 600 TABLET ORAL at 01:39

## 2022-07-09 RX ADMIN — OXYCODONE HYDROCHLORIDE 5 MG: 5 TABLET ORAL at 03:07

## 2022-07-09 ASSESSMENT — ENCOUNTER SYMPTOMS
HEADACHES: 0
CONFUSION: 0
NECK STIFFNESS: 0
ABDOMINAL PAIN: 0
EYE REDNESS: 0
SHORTNESS OF BREATH: 0
FEVER: 0
ARTHRALGIAS: 0
COLOR CHANGE: 0
DIFFICULTY URINATING: 0

## 2022-07-09 NOTE — Clinical Note
Fahad Guallpa was seen and treated in our emergency department on 7/9/2022.  He may return to work on 07/13/2022.       If you have any questions or concerns, please don't hesitate to call.      Oumar Virgen MD

## 2022-07-09 NOTE — ED PROVIDER NOTES
History     Chief Complaint   Patient presents with     Ankle Pain     ankle     The history is provided by the patient.   Trauma  Mechanism of injury: fall  Injury location: foot  Injury location detail: R ankle  Incident location: outdoors     Fall:       Fall occurred: tripped       Point of impact: feet    Current symptoms:       Associated symptoms:             Denies abdominal pain, chest pain and headache.         Allergies:  Allergies   Allergen Reactions     Amoxicillin      GI upset     Seasonal Allergies        Problem List:    There are no problems to display for this patient.       Past Medical History:    Past Medical History:   Diagnosis Date     Hypertension, benign 3/1/2011     Palpitations 5/14/2009       Past Surgical History:    Past Surgical History:   Procedure Laterality Date     root canal       VASECTOMY  2010       Family History:    Family History   Problem Relation Age of Onset     Colon Cancer Mother      Diabetes Paternal Grandmother      Hypertension Maternal Grandmother        Social History:  Marital Status:   [2]  Social History     Tobacco Use     Smoking status: Former Smoker     Smokeless tobacco: Current User     Types: Chew     Tobacco comment: no passive smoke exposure   Substance Use Topics     Alcohol use: Yes     Comment: occasionally     Drug use: No        Medications:    oxyCODONE-acetaminophen (PERCOCET) 5-325 MG tablet  BusPIRone HCl (BUSPAR PO)  LOSARTAN POTASSIUM PO          Review of Systems   Constitutional: Negative for fever.   HENT: Negative for congestion.    Eyes: Negative for redness.   Respiratory: Negative for shortness of breath.    Cardiovascular: Negative for chest pain.   Gastrointestinal: Negative for abdominal pain.   Genitourinary: Negative for difficulty urinating.   Musculoskeletal: Negative for arthralgias and neck stiffness.   Skin: Negative for color change.   Neurological: Negative for headaches.   Psychiatric/Behavioral: Negative for  "confusion.       Physical Exam   BP: 169/96  Pulse: 100  Temp: 96.8  F (36  C)  Resp: 16  Height: 172.7 cm (5' 8\")  Weight: 90.7 kg (200 lb)  SpO2: 100 %      Physical Exam  Constitutional:       General: He is not in acute distress.     Appearance: He is not diaphoretic.   HENT:      Head: Atraumatic.   Eyes:      General: No scleral icterus.     Pupils: Pupils are equal, round, and reactive to light.   Cardiovascular:      Heart sounds: Normal heart sounds.   Pulmonary:      Effort: No respiratory distress.      Breath sounds: Normal breath sounds.   Abdominal:      General: Bowel sounds are normal.      Palpations: Abdomen is soft.      Tenderness: There is no abdominal tenderness.   Musculoskeletal:      Right ankle: Swelling present. No deformity or ecchymosis. Tenderness present over the lateral malleolus.      Right foot: No swelling or deformity.        Legs:    Skin:     General: Skin is warm.      Findings: No rash.         ED Course                 Procedures                No results found for this or any previous visit (from the past 24 hour(s)).    Medications   oxyCODONE (ROXICODONE) tablet 5 mg (has no administration in time range)   ibuprofen (ADVIL/MOTRIN) tablet 600 mg (600 mg Oral Given 7/9/22 6409)       Assessments & Plan (with Medical Decision Making)   Tripped and twisted his right ankle  Xray : distal fibula fracture    RICE, Boot + crutches  Follow-up with PCP/ ortho clinic  I have reviewed the nursing notes.    I have reviewed the findings, diagnosis, plan and need for follow up with the patient.      New Prescriptions    OXYCODONE-ACETAMINOPHEN (PERCOCET) 5-325 MG TABLET    Take 1 tablet by mouth every 6 hours as needed for severe pain       Final diagnoses:   Closed fracture of distal end of right fibula, unspecified fracture morphology, initial encounter       7/9/2022   [facility]     Oumar Virgen MD  07/09/22 5019    "

## 2022-07-09 NOTE — DISCHARGE INSTRUCTIONS
[facility]  [address] [place] [address]  [phone]

## 2022-12-26 ENCOUNTER — HEALTH MAINTENANCE LETTER (OUTPATIENT)
Age: 37
End: 2022-12-26

## 2023-04-22 ENCOUNTER — HEALTH MAINTENANCE LETTER (OUTPATIENT)
Age: 38
End: 2023-04-22

## 2023-11-13 ENCOUNTER — OFFICE VISIT (OUTPATIENT)
Dept: CHIROPRACTIC MEDICINE | Facility: OTHER | Age: 38
End: 2023-11-13
Attending: CHIROPRACTOR
Payer: COMMERCIAL

## 2023-11-13 DIAGNOSIS — M54.50 ACUTE BILATERAL LOW BACK PAIN WITHOUT SCIATICA: ICD-10-CM

## 2023-11-13 DIAGNOSIS — M99.01 SEGMENTAL AND SOMATIC DYSFUNCTION OF CERVICAL REGION: ICD-10-CM

## 2023-11-13 DIAGNOSIS — M99.02 SEGMENTAL AND SOMATIC DYSFUNCTION OF THORACIC REGION: ICD-10-CM

## 2023-11-13 DIAGNOSIS — M99.03 SEGMENTAL AND SOMATIC DYSFUNCTION OF LUMBAR REGION: Primary | ICD-10-CM

## 2023-11-13 PROCEDURE — 99202 OFFICE O/P NEW SF 15 MIN: CPT | Mod: 25 | Performed by: CHIROPRACTOR

## 2023-11-13 PROCEDURE — 98941 CHIROPRACT MANJ 3-4 REGIONS: CPT | Mod: AT | Performed by: CHIROPRACTOR

## 2023-11-14 NOTE — PROGRESS NOTES
Subjective Finding:    Chief compalint: Patient presents with:  Back Pain: Low back pain from lifting a trailer   , Pain Scale: 5/10, Intensity: sharp, Duration: 2 days, Radiating:  bilateral buttock.    Date of injury:     Activities that the pain restricts:   Home/household/hobbies/social activities: Yes.  Work duties: Yes.  Sleep: No.  Makes symptoms better: rest.  Makes symptoms worse: activity and lumbar flexion.  Have you seen anyone else for the symptoms? No.  Work related: No.  Automobile related injury: No.    Objective and Assessment:    Posture Analysis:   High shoulder: .  Head tilt: .  High iliac crest: .  Head carriage: neutral.  Thoracic Kyphosis: neutral.  Lumbar Lordosis: forward.    Lumbar Range of Motion: extension decreased.  Cervical Range of Motion: .  Thoracic Range of Motion: .  Extremity Range of Motion: .    Palpation:   Quad lumb: bilateral, referred pain: no    Segmental dysfunction pre-treatment and treatment area: C4, T6, T7, L4, and L5.    Assessment post-treatment:  Cervical: ROM increased.  Thoracic: ROM increased.  Lumbar: ROM increased.    Comments: .      Complicating Factors: .    Procedure(s):  CMT:  81669 Chiropractic manipulative treatment 3-4 regions performed   Cervical: Diversified, See above for level, Supine, Thoracic: Diversified, See above for level, Prone, and Lumbar: Diversified, See above for level, Side posture    Modalities:  None performed this visit    Therapeutic procedures:  None    Plan:  Treatment plan:  2 times per week for 1 weeks.  Instructed patient: stretch as instructed at visit and walk 10 minutes.  Short term goals: increase ROM.  Long term goals: restore normal function.  Prognosis: excellent.

## 2024-05-09 ENCOUNTER — HOSPITAL ENCOUNTER (EMERGENCY)
Facility: HOSPITAL | Age: 39
Discharge: HOME | End: 2024-05-09
Attending: NURSE PRACTITIONER | Admitting: NURSE PRACTITIONER
Payer: COMMERCIAL

## 2024-05-09 VITALS
SYSTOLIC BLOOD PRESSURE: 155 MMHG | BODY MASS INDEX: 30.41 KG/M2 | WEIGHT: 200 LBS | RESPIRATION RATE: 18 BRPM | OXYGEN SATURATION: 97 % | HEART RATE: 95 BPM | DIASTOLIC BLOOD PRESSURE: 110 MMHG | TEMPERATURE: 97.8 F

## 2024-05-09 DIAGNOSIS — J02.0 ACUTE STREPTOCOCCAL PHARYNGITIS: Primary | ICD-10-CM

## 2024-05-09 LAB — GROUP A STREP BY PCR: DETECTED

## 2024-05-09 PROCEDURE — 87651 STREP A DNA AMP PROBE: CPT | Performed by: NURSE PRACTITIONER

## 2024-05-09 PROCEDURE — G0463 HOSPITAL OUTPT CLINIC VISIT: HCPCS

## 2024-05-09 PROCEDURE — 99213 OFFICE O/P EST LOW 20 MIN: CPT | Performed by: NURSE PRACTITIONER

## 2024-05-09 RX ORDER — AZITHROMYCIN 250 MG/1
500 TABLET, FILM COATED ORAL DAILY
Qty: 10 TABLET | Refills: 0 | Status: SHIPPED | OUTPATIENT
Start: 2024-05-09 | End: 2024-05-14

## 2024-05-09 ASSESSMENT — ENCOUNTER SYMPTOMS
TROUBLE SWALLOWING: 0
SORE THROAT: 1
FEVER: 0
CHILLS: 0

## 2024-05-09 NOTE — ED PROVIDER NOTES
History     Chief Complaint   Patient presents with    Pharyngitis     HPI  Fahad Guallpa is a 38 year old male who presents ambulatory to urgent care for evaluation of a sore throat with symptoms waxing and waning for about a week.  Patient initially thought it was allergies so he tried some Claritin which initially helped.  He has not noticed any significant fevers or chills.  No cough, shortness of breath or chest pain.  He is swallowing with minimal difficulty.  A coworker just recently got diagnosed with strep throat.  Patient tells me that he looked in his throat and he is also myself the back of his throat.  He is concerned about strep throat.  Declines respiratory viral testing today.    He has also been taking other OTC medications with minimal effectiveness.  History for tonsillectomy.    Allergies:  Allergies   Allergen Reactions    Amoxicillin      GI upset    Seasonal Allergies        Problem List:    There are no problems to display for this patient.       Past Medical History:    Past Medical History:   Diagnosis Date    Hypertension, benign 3/1/2011    Palpitations 5/14/2009       Past Surgical History:    Past Surgical History:   Procedure Laterality Date    root canal      VASECTOMY  2010       Family History:    Family History   Problem Relation Age of Onset    Colon Cancer Mother     Diabetes Paternal Grandmother     Hypertension Maternal Grandmother        Social History:  Marital Status:   [2]  Social History     Tobacco Use    Smoking status: Former    Smokeless tobacco: Current     Types: Chew    Tobacco comments:     no passive smoke exposure   Substance Use Topics    Alcohol use: Yes     Comment: occasionally    Drug use: No        Medications:    azithromycin (ZITHROMAX) 250 MG tablet  BusPIRone HCl (BUSPAR PO)  LOSARTAN POTASSIUM PO          Review of Systems   Constitutional:  Negative for chills and fever.   HENT:  Positive for sore throat. Negative for trouble swallowing.     All other systems reviewed and are negative.      Physical Exam   BP: (!) 155/110  Pulse: 95  Temp: 97.8  F (36.6  C)  Resp: 18  Weight: 90.7 kg (200 lb)  SpO2: 97 %      Physical Exam  Vitals and nursing note reviewed.   Constitutional:       General: He is not in acute distress.     Appearance: He is well-developed. He is not diaphoretic.   HENT:      Head: Normocephalic and atraumatic.      Right Ear: Tympanic membrane and ear canal normal.      Left Ear: Tympanic membrane and ear canal normal.      Mouth/Throat:      Mouth: Mucous membranes are moist. No oral lesions.      Pharynx: Oropharyngeal exudate and posterior oropharyngeal erythema present. No pharyngeal swelling or uvula swelling.      Tonsils: No tonsillar exudate. 0 on the right. 0 on the left.   Eyes:      Pupils: Pupils are equal, round, and reactive to light.   Cardiovascular:      Rate and Rhythm: Normal rate.   Pulmonary:      Effort: Pulmonary effort is normal.      Breath sounds: Normal breath sounds.   Musculoskeletal:      Cervical back: Normal range of motion and neck supple.   Skin:     General: Skin is warm and dry.      Coloration: Skin is not pale.   Neurological:      Mental Status: He is alert and oriented to person, place, and time.         ED Course        Procedures              Results for orders placed or performed during the hospital encounter of 05/09/24 (from the past 24 hour(s))   Group A Streptococcus PCR Throat Swab    Specimen: Throat; Swab   Result Value Ref Range    Group A strep by PCR Detected (A) Not Detected    Narrative    The Xpert Xpress Strep A test, performed on the [employer], is a rapid, qualitative in vitro diagnostic test for the detection of Streptococcus pyogenes (Group A ß-hemolytic Streptococcus, Strep A) in throat swab specimens from patients with signs and symptoms of pharyngitis. The Xpert Xpress Strep A test can be used as an aid in the diagnosis of Group A Streptococcal  pharyngitis. The assay is not intended to monitor treatment for Group A Streptococcus infections. The Xpert Xpress Strep A test utilizes an automated real-time polymerase chain reaction (PCR) to detect Streptococcus pyogenes DNA.       Medications - No data to display    Assessments & Plan (with Medical Decision Making)   38-year-old male presenting for evaluation of sore throat x 1 week.  Patient with erythema to posterior pharynx along with exudate.  Uvula is midline.  History for tonsillectomy.  Patient tested positive for strep throat.  Reports an allergy to amoxicillin.  He notes he has tolerated azithromycin in the past.  Will treat with azithromycin as prescribed.  Tylenol or ibuprofen as needed for pain.  Warm salt water gargles and drink plenty of fluids.  Getting new toothbrush after 24 hours of antibiotics.  Follow-up with primary doctor if no improvement in symptoms.  Return to urgent care or emergency room for any worsening or concerning symptoms.    I have reviewed the nursing notes.    I have reviewed the findings, diagnosis, plan and need for follow up with the patient.  This document was prepared using a combination of typing and voice generated software.  While every attempt was made for accuracy, spelling and grammatical errors may exist.         New Prescriptions    AZITHROMYCIN (ZITHROMAX) 250 MG TABLET    Take 2 tablets (500 mg) by mouth daily for 5 days       Final diagnoses:   Acute streptococcal pharyngitis       5/9/2024   [facility]       Laverne, Lauryn, CNP  05/09/24 [id number]

## 2024-05-09 NOTE — DISCHARGE INSTRUCTIONS
You have strep throat which will be treated with antibiotic I prescribed today.  Take the antibiotic until it is finished.  Tylenol or ibuprofen as needed for pain.  Do warm salt water gargles and drink plenty of fluids.    Follow-up with your primary doctor as needed.    Return to urgent care or emergency room for any worsening or concerning symptoms.

## 2024-05-09 NOTE — ED TRIAGE NOTES
Pt presents with c/o having an increased sore throat that has been going on for a week now   Pt states will have a good day and bad day on and off but has never fully gotten better   S/x started a week ago   Pt does not have tonsils   Also pt does not want covid multi   No otc meds taken today

## 2024-06-23 ENCOUNTER — HEALTH MAINTENANCE LETTER (OUTPATIENT)
Age: 39
End: 2024-06-23

## 2024-09-10 ENCOUNTER — OFFICE VISIT (OUTPATIENT)
Dept: CHIROPRACTIC MEDICINE | Facility: OTHER | Age: 39
End: 2024-09-10
Attending: CHIROPRACTOR
Payer: OTHER MISCELLANEOUS

## 2024-09-10 DIAGNOSIS — M99.01 SEGMENTAL AND SOMATIC DYSFUNCTION OF CERVICAL REGION: Primary | ICD-10-CM

## 2024-09-10 DIAGNOSIS — M99.02 SEGMENTAL AND SOMATIC DYSFUNCTION OF THORACIC REGION: ICD-10-CM

## 2024-09-10 DIAGNOSIS — M99.03 SEGMENTAL AND SOMATIC DYSFUNCTION OF LUMBAR REGION: ICD-10-CM

## 2024-09-10 DIAGNOSIS — M54.2 CERVICALGIA: ICD-10-CM

## 2024-09-10 PROCEDURE — 98941 CHIROPRACT MANJ 3-4 REGIONS: CPT | Mod: AT | Performed by: CHIROPRACTOR

## 2024-09-23 NOTE — PROGRESS NOTES
Subjective Finding:    Chief compalint: Patient presents with:  Neck Pain: With back pain   , Pain Scale: 2/10, Intensity: dull, Duration: 1 weeks, Radiating: no.    Date of injury:     Activities that the pain restricts:   Home/household/hobbies/social activities: Yes.  Work duties: Yes.  Sleep: No.  Makes symptoms better: rest.  Makes symptoms worse: lumbar extension, lumbar flexion, and cervical extension.  Have you seen anyone else for the symptoms? No.  Work related: No.  Automobile related injury: No.    Objective and Assessment:    Posture Analysis:   High shoulder: .  Head tilt: .  High iliac crest: .  Head carriage: forward.  Thoracic Kyphosis: neutral.  Lumbar Lordosis: neutral.    Lumbar Range of Motion: extension decreased.  Cervical Range of Motion: left rotation decreased and right rotation decreased.  Thoracic Range of Motion: .  Extremity Range of Motion: .    Palpation:   Lev scapulae: sharp pain, referred pain: no    Segmental dysfunction pre-treatment and treatment area: C6, C7, T4, L4, and L5.    Assessment post-treatment:  Cervical: ROM increased.  Thoracic: ROM increased.  Lumbar: ROM increased.    Comments: .      Complicating Factors: .    Procedure(s):  CMT:  12412 Chiropractic manipulative treatment 3-4 regions performed   Cervical: Diversified, See above for level, Supine, Thoracic: Diversified, See above for level, Prone, and Lumbar: Diversified, See above for level, Side posture    Modalities:  None performed this visit    Therapeutic procedures:  None    Plan:  Treatment plan: as needed.  Instructed patient: stretch as instructed at visit.  Short term goals: increase ROM.  Long term goals: .  Prognosis: very good.

## 2025-03-31 ENCOUNTER — HOSPITAL ENCOUNTER (EMERGENCY)
Facility: HOSPITAL | Age: 40
Discharge: HOME | End: 2025-03-31
Attending: NURSE PRACTITIONER
Payer: COMMERCIAL

## 2025-03-31 VITALS
OXYGEN SATURATION: 98 % | RESPIRATION RATE: 18 BRPM | HEART RATE: 94 BPM | TEMPERATURE: 98.7 F | DIASTOLIC BLOOD PRESSURE: 88 MMHG | SYSTOLIC BLOOD PRESSURE: 165 MMHG

## 2025-03-31 DIAGNOSIS — J01.90 ACUTE SINUSITIS WITH SYMPTOMS > 10 DAYS: Primary | ICD-10-CM

## 2025-03-31 PROCEDURE — G0463 HOSPITAL OUTPT CLINIC VISIT: HCPCS

## 2025-03-31 PROCEDURE — 99213 OFFICE O/P EST LOW 20 MIN: CPT | Performed by: NURSE PRACTITIONER

## 2025-03-31 RX ORDER — DOXYCYCLINE 100 MG/1
100 CAPSULE ORAL 2 TIMES DAILY
Qty: 20 CAPSULE | Refills: 0 | Status: SHIPPED | OUTPATIENT
Start: 2025-03-31 | End: 2025-04-10

## 2025-03-31 ASSESSMENT — ENCOUNTER SYMPTOMS
SINUS PAIN: 1
NAUSEA: 0
COUGH: 0
HEADACHES: 0
SHORTNESS OF BREATH: 0
PSYCHIATRIC NEGATIVE: 1
VOMITING: 0
DIARRHEA: 0
RHINORRHEA: 1
SORE THROAT: 0
SINUS PRESSURE: 1
TROUBLE SWALLOWING: 0
CHILLS: 0
FEVER: 0

## 2025-03-31 ASSESSMENT — COLUMBIA-SUICIDE SEVERITY RATING SCALE - C-SSRS
2. HAVE YOU ACTUALLY HAD ANY THOUGHTS OF KILLING YOURSELF IN THE PAST MONTH?: NO
1. IN THE PAST MONTH, HAVE YOU WISHED YOU WERE DEAD OR WISHED YOU COULD GO TO SLEEP AND NOT WAKE UP?: NO
6. HAVE YOU EVER DONE ANYTHING, STARTED TO DO ANYTHING, OR PREPARED TO DO ANYTHING TO END YOUR LIFE?: NO

## 2025-03-31 NOTE — ED PROVIDER NOTES
History     Chief Complaint   Patient presents with    Otalgia     HPI  Fahad Guallpa is a 39 year old male who presents to urgent care today ambulatory with complaints of ongoing sinusitis for the past couple weeks to a month.  Has seasonal allergies and takes Claritin and Flonase.  Denies any fever, chills, nausea, vomiting, diarrhea, shortness of breath or chest pain.  Patient also states that he recently got pinkeye from his wife, used her Polytrim eyedrops for the last 4 days and denies any current eye concerns.  No other concerns.    Allergies:  Allergies   Allergen Reactions    Amoxicillin      GI upset    Seasonal Allergies        Problem List:    There are no active problems to display for this patient.       Past Medical History:    Past Medical History:   Diagnosis Date    Hypertension, benign 3/1/2011    Palpitations 5/14/2009       Past Surgical History:    Past Surgical History:   Procedure Laterality Date    COLONOSCOPY - HIM SCAN N/A 03/12/2015    NL recheck 5 years    root canal      VASECTOMY  01/01/2010       Family History:    Family History   Problem Relation Age of Onset    Colon Cancer Mother     Diabetes Paternal Grandmother     Hypertension Maternal Grandmother        Social History:  Marital Status:   [2]  Social History     Tobacco Use    Smoking status: Former    Smokeless tobacco: Current     Types: Chew    Tobacco comments:     no passive smoke exposure   Substance Use Topics    Alcohol use: Yes     Comment: occasionally    Drug use: No        Medications:    doxycycline hyclate (VIBRAMYCIN) 100 MG capsule  LOSARTAN POTASSIUM PO  BusPIRone HCl (BUSPAR PO)      Review of Systems   Constitutional:  Negative for chills and fever.   HENT:  Positive for congestion, rhinorrhea, sinus pressure and sinus pain. Negative for ear pain, sore throat and trouble swallowing.    Respiratory:  Negative for cough and shortness of breath.    Cardiovascular:  Negative for chest pain.    Gastrointestinal:  Negative for diarrhea, nausea and vomiting.   Genitourinary:  Negative for decreased urine volume.   Musculoskeletal:  Negative for gait problem.   Skin:  Negative for rash.   Neurological:  Negative for headaches.   Psychiatric/Behavioral: Negative.       Physical Exam   BP: (!) 165/88  Pulse: 94  Temp: 98.7  F (37.1  C)  Resp: 18  SpO2: 98 %    Physical Exam  Vitals and nursing note reviewed.   Constitutional:       General: He is not in acute distress.     Appearance: Normal appearance. He is not ill-appearing or toxic-appearing.   HENT:      Right Ear: Tympanic membrane, ear canal and external ear normal.      Left Ear: Tympanic membrane, ear canal and external ear normal.      Nose: Congestion and rhinorrhea present.      Right Sinus: Maxillary sinus tenderness and frontal sinus tenderness present.      Left Sinus: Maxillary sinus tenderness and frontal sinus tenderness present.      Mouth/Throat:      Mouth: Mucous membranes are moist.      Pharynx: No oropharyngeal exudate or posterior oropharyngeal erythema.   Eyes:      Extraocular Movements: Extraocular movements intact.      Conjunctiva/sclera: Conjunctivae normal.      Pupils: Pupils are equal, round, and reactive to light.   Cardiovascular:      Rate and Rhythm: Normal rate and regular rhythm.      Pulses: Normal pulses.      Heart sounds: Normal heart sounds.   Pulmonary:      Effort: Pulmonary effort is normal.      Breath sounds: Normal breath sounds.   Musculoskeletal:      Cervical back: Normal range of motion and neck supple.   Neurological:      Mental Status: He is alert.   Psychiatric:         Mood and Affect: Mood normal.       ED Course     Procedures    No results found for this or any previous visit (from the past 24 hours).    Medications - No data to display    Assessments & Plan (with Medical Decision Making)     I have reviewed the nursing notes.    I have reviewed the findings, diagnosis, plan and need for follow  up with the patient.  (J01.90) Acute sinusitis with symptoms > 10 days  (primary encounter diagnosis)  Plan:   Patient ambulatory with a nontoxic appearance.  Lungs clear throughout.  PERRL, conjunctiva normal.  No signs of otitis media or strep.  Maxillary and frontal sinus pain and tenderness, ongoing for 2 to 4 weeks.  No abdominal pain, vomiting or diarrhea.  Will start patient on doxycycline for sinusitis.  Patient to continue Claritin and Flonase.  Push fluids.  Follow-up with primary care provider or return to urgent care/ED with any worsening in condition or additional concerns.  Patient in agreement treatment plan.    Discharge Medication List as of 3/31/2025 12:00 PM        START taking these medications    Details   doxycycline hyclate (VIBRAMYCIN) 100 MG capsule Take 1 capsule (100 mg) by mouth 2 times daily for 10 days., Disp-20 capsule, R-0, E-Prescribe           Final diagnoses:   Acute sinusitis with symptoms > 10 days     3/31/2025   [facility]       Ara Cantrell NP  03/31/25 [id number]

## 2025-03-31 NOTE — DISCHARGE INSTRUCTIONS
Doxycycline as ordered  - Take entire course of antibiotic even if you start to feel better.  - Antibiotics can cause stomach upset including nausea and diarrhea. Read your bottle or ask the pharmacist if antibiotic can be taken with food to help prevent nausea. If you have symptoms of diarrhea you can take an over-the-counter probiotic and/or increase foods with probiotics such as yogurt, [place], sauerkraut.    Continue over-the-counter Flonase    Alternate Tylenol and ibuprofen as needed for pain or fever    Push fluids    Follow-up with primary care provider or return to urgent care/ED with any worsening in condition or additional concerns.

## 2025-05-08 ENCOUNTER — OFFICE VISIT (OUTPATIENT)
Dept: FAMILY MEDICINE | Facility: OTHER | Age: 40
End: 2025-05-08
Attending: STUDENT IN AN ORGANIZED HEALTH CARE EDUCATION/TRAINING PROGRAM
Payer: COMMERCIAL

## 2025-05-08 ENCOUNTER — RESULTS FOLLOW-UP (OUTPATIENT)
Dept: FAMILY MEDICINE | Facility: OTHER | Age: 40
End: 2025-05-08

## 2025-05-08 ENCOUNTER — ANCILLARY PROCEDURE (OUTPATIENT)
Dept: GENERAL RADIOLOGY | Facility: OTHER | Age: 40
End: 2025-05-08
Attending: STUDENT IN AN ORGANIZED HEALTH CARE EDUCATION/TRAINING PROGRAM
Payer: COMMERCIAL

## 2025-05-08 VITALS
WEIGHT: 203.2 LBS | HEIGHT: 68 IN | DIASTOLIC BLOOD PRESSURE: 84 MMHG | BODY MASS INDEX: 30.8 KG/M2 | TEMPERATURE: 97.4 F | HEART RATE: 84 BPM | SYSTOLIC BLOOD PRESSURE: 124 MMHG | OXYGEN SATURATION: 98 %

## 2025-05-08 DIAGNOSIS — M25.531 RIGHT WRIST PAIN: ICD-10-CM

## 2025-05-08 DIAGNOSIS — F41.9 ANXIETY: ICD-10-CM

## 2025-05-08 DIAGNOSIS — I10 BENIGN ESSENTIAL HYPERTENSION: ICD-10-CM

## 2025-05-08 DIAGNOSIS — Z76.89 ENCOUNTER TO ESTABLISH CARE: Primary | ICD-10-CM

## 2025-05-08 PROBLEM — E78.2 MIXED HYPERLIPIDEMIA: Status: ACTIVE | Noted: 2025-05-08

## 2025-05-08 PROBLEM — E78.2 MIXED HYPERLIPIDEMIA: Chronic | Status: ACTIVE | Noted: 2025-05-08

## 2025-05-08 LAB
ANION GAP SERPL CALCULATED.3IONS-SCNC: 11 MMOL/L (ref 7–15)
BUN SERPL-MCNC: 10.6 MG/DL (ref 6–20)
CALCIUM SERPL-MCNC: 9.6 MG/DL (ref 8.8–10.4)
CHLORIDE SERPL-SCNC: 101 MMOL/L (ref 98–107)
CREAT SERPL-MCNC: 0.9 MG/DL (ref 0.67–1.17)
EGFRCR SERPLBLD CKD-EPI 2021: >90 ML/MIN/1.73M2
GLUCOSE SERPL-MCNC: 114 MG/DL (ref 70–99)
HCO3 SERPL-SCNC: 23 MMOL/L (ref 22–29)
POTASSIUM SERPL-SCNC: 3.8 MMOL/L (ref 3.4–5.3)
SODIUM SERPL-SCNC: 135 MMOL/L (ref 135–145)

## 2025-05-08 PROCEDURE — 73110 X-RAY EXAM OF WRIST: CPT | Mod: RT | Performed by: RADIOLOGY

## 2025-05-08 RX ORDER — CALCIUM/MAGNESIUM/ZINC 333-133 MG
1 TABLET ORAL 2 TIMES DAILY
COMMUNITY

## 2025-05-08 RX ORDER — B COMPLEX, C NO.20/FOLIC ACID 1 MG
1 CAPSULE ORAL DAILY
COMMUNITY

## 2025-05-08 RX ORDER — LORATADINE 10 MG/1
10 TABLET ORAL DAILY
COMMUNITY

## 2025-05-08 RX ORDER — CIDER VINEGAR 300 MG
TABLET ORAL
COMMUNITY

## 2025-05-08 RX ORDER — BUSPIRONE HYDROCHLORIDE 5 MG/1
5 TABLET ORAL 2 TIMES DAILY PRN
Qty: 30 TABLET | Refills: 0 | Status: SHIPPED | OUTPATIENT
Start: 2025-05-08

## 2025-05-08 RX ORDER — LOSARTAN POTASSIUM 50 MG/1
50 TABLET ORAL DAILY
COMMUNITY
Start: 2024-11-12

## 2025-05-08 RX ORDER — CHLORAL HYDRATE 500 MG
2 CAPSULE ORAL DAILY
COMMUNITY

## 2025-05-08 ASSESSMENT — ANXIETY QUESTIONNAIRES
7. FEELING AFRAID AS IF SOMETHING AWFUL MIGHT HAPPEN: NOT AT ALL
3. WORRYING TOO MUCH ABOUT DIFFERENT THINGS: SEVERAL DAYS
GAD7 TOTAL SCORE: 3
1. FEELING NERVOUS, ANXIOUS, OR ON EDGE: SEVERAL DAYS
6. BECOMING EASILY ANNOYED OR IRRITABLE: SEVERAL DAYS
2. NOT BEING ABLE TO STOP OR CONTROL WORRYING: NOT AT ALL
IF YOU CHECKED OFF ANY PROBLEMS ON THIS QUESTIONNAIRE, HOW DIFFICULT HAVE THESE PROBLEMS MADE IT FOR YOU TO DO YOUR WORK, TAKE CARE OF THINGS AT HOME, OR GET ALONG WITH OTHER PEOPLE: NOT DIFFICULT AT ALL
GAD7 TOTAL SCORE: 3
5. BEING SO RESTLESS THAT IT IS HARD TO SIT STILL: NOT AT ALL

## 2025-05-08 ASSESSMENT — PAIN SCALES - GENERAL: PAINLEVEL_OUTOF10: MODERATE PAIN (6)

## 2025-05-08 ASSESSMENT — PATIENT HEALTH QUESTIONNAIRE - PHQ9: 5. POOR APPETITE OR OVEREATING: NOT AT ALL

## 2025-05-08 NOTE — PATIENT INSTRUCTIONS
Aleve twice daily to calm inflammation.   Wear wrist brace for at a least week   Ice/rest.   Let me know if not improving.      Thank you for choosing [facility].   I have office hours 8:00 am to 4:30 pm on Mondays, Tuesdays, Thursdays and Fridays. I am out of the office most Wednesdays, although I do occasionally work one Wednesday per month.   Following your visit, if you had labs and diagnostic testing, once they have returned, I will review them and you will be contacted by myself or my nurse if there are concerns. You can also view the labs on [employer].   For refills, notify your pharmacy regarding what you need and the pharmacy will generate a refill request. Please plan ahead and allow 3-5 days for refill requests.  If you have an urgent/same day appointment need, please request an urgent visit when you call and our support staff will send me an Overbook request to try to accommodate you for the urgent visit. I like to fit in and see my own patients and hopefully save you time too!   You can also now schedule appointments on the [employer] virgil.   In the event that you need to be seen for urgent concerns and I am out of office, please see one of my colleagues for acute concerns or you may also present to Urgent Care or the ER.  I appreciate the opportunity to serve you and look forward to supporting your healthcare needs in the future.

## 2025-05-08 NOTE — PROGRESS NOTES
Assessment & Plan     Encounter to establish care  Medical, surgical, family, and social histories discussed updated. Reviewed active healthcare problems. Medications reviewed. Reviewed [facility] records.     Benign essential hypertension  Controlled. Continues on losartan. Will continue current dose. Bmp stable. Monitor yearly. Not yet due for refills on rx.   - Basic metabolic panel; Future  - Basic metabolic panel    Anxiety  Stable. Rare use of as needed buspar which he reports works well for him. Discussed usually daily medication but okay to continue as needed if working well.   - busPIRone (BUSPAR) 5 MG tablet; Take 1 tablet (5 mg) by mouth 2 times daily as needed (anxiety).    Right wrist pain  Likely ligamentous strain. No fracture on xray.   Will trial one week of bid naproxen, wrist brace, and rest with ice.   If no improvement in 7-10 days, consider MRI imaging for more extensive ligamentous damage with mechanism of injury.   - XR Wrist Right G/E 3 Views (Clinic Performed); Future  - Wrist/Arm/Hand Bracing Supplies Order Wrist Brace; Right; non-thumb spica        The longitudinal plan of care for the diagnosis(es)/condition(s) as documented were addressed during this visit. Due to the added complexity in care, I will continue to support Fahad in the subsequent management and with ongoing continuity of care.    Subjective   Fahad is a 39 year old, presenting for the following health issues:  Establish Care, Hypertension, and Anxiety        5/8/2025     3:39 PM   Additional Questions   Roomed by Dulce HARTMAN     History of Present Illness       Reason for visit:  New doctor visit   He is taking medications regularly.        Hypertension Follow-up    Do you check your blood pressure regularly outside of the clinic? Yes   Are you following a low salt diet? Yes  Are your blood pressures ever more than 140 on the top number (systolic) OR more   than 90 on the bottom number (diastolic), for example 140/90? Yes, but  repeat at home decreases    Has been on this dose 8-10 years  History of metoprolol - wasn't working     BP Readings from Last 2 Encounters:   05/08/25 124/84   03/31/25 (!) 165/88     Anxiety   How are you doing with your anxiety since your last visit? Improved   Are you having other symptoms that might be associated with anxiety? Yes:  restless, public speaking, dizziness  Have you had a significant life event? No   Are you feeling depressed? No  Do you have any concerns with your use of alcohol or other drugs? No    Uses buspar as needed - used to be daily   Does feel like it helps as needed - using it this way now   Only needing once in the last three months     Social History     Tobacco Use    Smoking status: Former     Current packs/day: 0.00     Average packs/day: 1 pack/day for 9.0 years (9.0 ttl pk-yrs)     Types: Cigarettes     Start date: 2006     Quit date: 2015     Years since quitting: 10.3    Smokeless tobacco: Current     Types: Chew    Tobacco comments:     no passive smoke exposure   Substance Use Topics    Alcohol use: Yes     Comment: occasionally    Drug use: No         5/8/2025     3:45 PM   DELVIN-7 SCORE   Total Score 3          No data to display                  5/8/2025     3:45 PM   DELVIN-7    1. Feeling nervous, anxious, or on edge 1   2. Not being able to stop or control worrying 0   3. Worrying too much about different things 1   4. Trouble relaxing 0   5. Being so restless that it is hard to sit still 0   6. Becoming easily annoyed or irritable 1   7. Feeling afraid, as if something awful might happen 0   DELVIN-7 Total Score 3   If you checked any problems, how difficult have they made it for you to do your work, take care of things at home, or get along with other people? Not difficult at all       Pain History:  When did you first notice your pain? Sunday, right wrist, caught heavy object falling   Have you seen anyone else for your pain? No  How has your pain affected your ability to  "work? Pain does not limit ability to work   What type of work do you or did you do? 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 Musculoskeletal problem/pain  Onset/Duration: Sunday start  Description  Location: wrist - right  Joint Swelling: YES  Redness: No  Pain: YES  Warmth: No  Intensity:  moderate  Progression of Symptoms:  worsens with use  Accompanying signs and symptoms:   Fevers: No  Numbness/tingling/weakness: YES, known carpal tunnel  History  Trauma to the area: YES  Recent illness:  No  Previous similar problem: No  Previous evaluation:  No  Precipitating or alleviating factors:  Aggravating factors include: overuse  Therapies tried and outcome: rest/inactivity    Was pushing something heavy into his attack and his wrist bent back  Pain over ulnar area  Worse with use, 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      Physical Exam  Constitutional:       General: He is not in acute distress.     Appearance: Normal appearance. He is not ill-appearing.   Cardiovascular:      Rate and Rhythm: Normal rate and regular rhythm.      Pulses: Normal pulses.      Heart sounds: No murmur heard.  Pulmonary:      Effort: Pulmonary effort is normal.      Breath sounds: Normal breath sounds.   Musculoskeletal:      Comments: No swelling or deformity over his right wrist.  He has full range of motion with flexion, extension, inversion and eversion.  There is slight discomfort with wrist flexion.  5/5 strength with resisted strength testing in all areas.  Again more discomfort with flexion/resisted flexion.  He has no tenderness over the metacarpals or wrist bones.  He is tender over the distal ulna.  No tenderness over the " distal radius.  No crepitus appreciated.  Negative grind test.   Neurological:      Mental Status: He is alert.   Psychiatric:         Mood and Affect: Mood normal.         Behavior: Behavior normal.          Results for orders placed or performed in visit on 05/08/25   XR Wrist Right G/E 3 Views (Clinic Performed)     Status: None    Narrative    PROCEDURE:  XR WRIST RIGHT G/E 3 VIEWS    HISTORY: pain along ulna; Right wrist pain    COMPARISON:  None.    TECHNIQUE:  4 views of the right wrist were obtained.    FINDINGS:  No fracture or dislocation is identified. The joint spaces  are preserved.        Impression    IMPRESSION: Normal right wrist      ELI MUNOZ MD         SYSTEM ID:  [id number]                 Signed Electronically by: Rosio Pace MD

## 2025-07-12 ENCOUNTER — HEALTH MAINTENANCE LETTER (OUTPATIENT)
Age: 40
End: 2025-07-12

## 2025-07-28 ENCOUNTER — OFFICE VISIT (OUTPATIENT)
Dept: CHIROPRACTIC MEDICINE | Facility: OTHER | Age: 40
End: 2025-07-28
Attending: CHIROPRACTOR
Payer: COMMERCIAL

## 2025-07-28 DIAGNOSIS — M99.02 SEGMENTAL AND SOMATIC DYSFUNCTION OF THORACIC REGION: Primary | ICD-10-CM

## 2025-07-28 DIAGNOSIS — M99.03 SEGMENTAL AND SOMATIC DYSFUNCTION OF LUMBAR REGION: ICD-10-CM

## 2025-07-28 DIAGNOSIS — M54.50 ACUTE BILATERAL LOW BACK PAIN WITHOUT SCIATICA: ICD-10-CM

## 2025-07-28 DIAGNOSIS — M99.01 SEGMENTAL AND SOMATIC DYSFUNCTION OF CERVICAL REGION: ICD-10-CM

## 2025-07-28 PROCEDURE — 98941 CHIROPRACT MANJ 3-4 REGIONS: CPT | Mod: AT | Performed by: CHIROPRACTOR

## 2025-07-28 PROCEDURE — 99212 OFFICE O/P EST SF 10 MIN: CPT | Mod: 25 | Performed by: CHIROPRACTOR

## 2025-07-30 NOTE — PROGRESS NOTES
Subjective Finding:    Chief compalint: Patient presents with:  Back Pain , Pain Scale: 2/10, Intensity: dull, Duration: 2 weeks, Radiating: no.    Date of injury:     Activities that the pain restricts:   Home/household/hobbies/social activities: Yes.  Work duties: No.  Sleep: No.  Makes symptoms better: rest.  Makes symptoms worse: thoracic extension.  Have you seen anyone else for the symptoms? No.  Work related: No.  Automobile related injury: No.    Objective and Assessment:    Posture Analysis:   High shoulder: .  Head tilt: .  High iliac crest: .  Head carriage: neutral.  Thoracic Kyphosis: neutral.  Lumbar Lordosis: neutral.    Lumbar Range of Motion: .  Cervical Range of Motion: .  Thoracic Range of Motion: extension decreased.  Extremity Range of Motion: .    Palpation:   Quad lumb: bilateral, referred pain: no  T paraspinals: sharp pain, no    Segmental dysfunction pre-treatment and treatment area: C2, C5, T7, T8, and L4.    Assessment post-treatment:  Cervical: ROM increased.  Thoracic: ROM increased.  Lumbar: ROM increased.    Comments: .      Complicating Factors: .    Procedure(s):  CMT:  97286 Chiropractic manipulative treatment 3-4 regions performed   Cervical: Diversified, See above for level, Supine, Thoracic: Diversified, See above for level, Prone, and Lumbar: Diversified, See above for level, Side posture    Modalities:  None performed this visit    Therapeutic procedures:  None    Plan:  Treatment plan: PRN.  Instructed patient: stretch as instructed at visit.  Short term goals: increase ROM.  Long term goals: increase ADL.  Prognosis: very good.